# Patient Record
Sex: FEMALE | Race: WHITE | NOT HISPANIC OR LATINO | Employment: OTHER | URBAN - METROPOLITAN AREA
[De-identification: names, ages, dates, MRNs, and addresses within clinical notes are randomized per-mention and may not be internally consistent; named-entity substitution may affect disease eponyms.]

---

## 2018-04-09 NOTE — PATIENT DISCUSSION
Repeat visual field test to confirm defect but today's HVF does NOT confirm progression OD so IOP is acceptable as is.  CPM.

## 2019-05-15 NOTE — PATIENT DISCUSSION
Recommend Mini lower lift, +/- platysmaplasty, pre/post-tragel, SMAS to chin, ML,NLF(discussed risks and benefits of sx. .. ).

## 2020-03-02 NOTE — PATIENT DISCUSSION
3/2/2020: RNFL and IOP stable.  pt is normally strict on gtts but has been out the last week.  reiterated importance of consistency to prevent VA loss/blindness.

## 2020-08-03 NOTE — PATIENT DISCUSSION
8/3/2020:  pt feels peripheral VA is worsening ed we will get HVF in very near future to see if this is able to be substantiated.  also check color VA at FU.

## 2020-08-17 NOTE — PATIENT DISCUSSION
8/17/2020: ed no change in peripheral VA noted on HVF today.  color vision is normal.  CPM and follow as IOP is acceptable for ONHs and VFs at this time.

## 2021-05-11 NOTE — PATIENT DISCUSSION
Recommend Mini lower lift, +platysmaplasty, pretragel, SMAS to chin (discussed risks and benefits of sx. .. ).

## 2021-05-11 NOTE — PATIENT DISCUSSION
Recommend Bilateral upper lid blepharoplasty. cosmetic (discussed risks and benefits of sx. ..). Right eye smaller, lid skin heavier.

## 2021-05-17 NOTE — PATIENT DISCUSSION
Patient informed of available non-opioid medications and other non-pharmacological options. Discussed advantages and disadvantages of the alternatives, including whether the patient is at-risk for, or has a history of, controlled substance abuse or misuse, and the patient’s personal preferences. [facility] “Opioid Alternative Pamphlet” was provided to patient.

## 2021-06-08 NOTE — PATIENT DISCUSSION
8/3/2020:  pt feels peripheral VA is worsening ed we will get HVF in very near future to see if this is able to be substantiated.  also check color VA at FU. This writer attempted to contact the patient on 12/22/20    Reason for call: results and appointment needed from the provider and left message.    If patient calls back:   Relay message from provider below, (read verbatim) and assist the patient with making an appointment with internal medicine.  Document that pt called and close encounter.    Tiara Mann RN     PLEASE CALL PATIENT:   Dear Zulay,    Your test results are listed below.       Kidney function improved. Liver tests still poor. ast over 400.  Continue with gastroenterology for this.     She was to establish with internal medicine, please have her make a f/u appointment with them and continue with them for ongoing care as she has become too complicated for me to follow.       Sincerely,   Dania Munoz PA-C

## 2021-06-08 NOTE — PATIENT DISCUSSION
Patient informed of available non-opioid medications and other non-pharmacological options. Discussed advantages and disadvantages of the alternatives, including whether the patient is at-risk for, or has a history of, controlled substance abuse or misuse, and the patient’s personal preferences. [facility] “Opioid Alternative Pamphlet” was provided to patient.

## 2021-06-08 NOTE — PATIENT DISCUSSION
Patient informed of available non-opioid medications and other non-pharmacological options. Discussed advantages and disadvantages of the alternatives, including whether the patient is at-risk for, or has a history of, controlled substance abuse or misuse, and the patient’s personal preferences. [facility] “Opioid Alternative Pamphlet” was provided to patient.

## 2021-06-15 NOTE — PATIENT DISCUSSION
One week post op: lower lids in good position, no infection, healing well, use erythromycin BID to lower eyelids and Tobradex BID. Wear sunglasses and hat while outdoors. Avoid sun exposure. Use e-mycin to massage BLL upward 2-3x/day.

## 2021-06-15 NOTE — PATIENT DISCUSSION
One week post op: great curve and shape to upper lids, no infection, healing well, sutures intact and removed, use erythromycin BID to upper eyelids. Wear sunglasses and hat while outdoors. Avoid sun exposure.

## 2021-06-29 NOTE — PATIENT DISCUSSION
Slight Ectropian LLL, lateral pulling more then RLL. Getting better. Continue massaging in an upward motion.

## 2021-06-29 NOTE — PATIENT DISCUSSION
Recommend 64 units of Botox. Patient elects Botox injection. 36units discarded, 64 units used. (discussed risks and benefits of BOTOX. ..). Lot# [id number], exp. 10/2023.

## 2021-07-07 ENCOUNTER — NEW PATIENT (OUTPATIENT)
Dept: URBAN - METROPOLITAN AREA CLINIC 26 | Facility: CLINIC | Age: 76
End: 2021-07-07

## 2021-07-07 VITALS
HEART RATE: 81 BPM | SYSTOLIC BLOOD PRESSURE: 98 MMHG | BODY MASS INDEX: 42.82 KG/M2 | DIASTOLIC BLOOD PRESSURE: 72 MMHG | WEIGHT: 204 LBS | HEIGHT: 58 IN

## 2021-07-07 DIAGNOSIS — H35.373: ICD-10-CM

## 2021-07-07 DIAGNOSIS — H35.363: ICD-10-CM

## 2021-07-07 DIAGNOSIS — H33.322: ICD-10-CM

## 2021-07-07 DIAGNOSIS — H43.391: ICD-10-CM

## 2021-07-07 DIAGNOSIS — H35.343: ICD-10-CM

## 2021-07-07 DIAGNOSIS — H43.813: ICD-10-CM

## 2021-07-07 DIAGNOSIS — H35.461: ICD-10-CM

## 2021-07-07 PROCEDURE — 92250 FUNDUS PHOTOGRAPHY W/I&R: CPT

## 2021-07-07 PROCEDURE — 92134 CPTRZ OPH DX IMG PST SGM RTA: CPT

## 2021-07-07 PROCEDURE — 99204 OFFICE O/P NEW MOD 45 MIN: CPT

## 2021-07-07 ASSESSMENT — TONOMETRY
OD_IOP_MMHG: 16
OS_IOP_MMHG: 14

## 2021-07-07 ASSESSMENT — VISUAL ACUITY
OS_SC: 20/20-1
OD_SC: 20/20-2

## 2021-07-20 NOTE — PATIENT DISCUSSION
Follow with Dr. Suzan Khoury in 4-5 months if LLL bump is still persistent. Pt may need a steroid injection if bump does not resolve on its own.

## 2021-07-20 NOTE — PATIENT DISCUSSION
Recommend Mini lower lift, + platysmaplasty, post-tragel, SMAS to chin (discussed risks and benefits of sx. .. ).

## 2021-08-30 PROBLEM — Z98.890: Noted: 2021-08-30

## 2021-09-23 NOTE — PATIENT DISCUSSION
9/23/21:.  S/P BULB pt feels peripheral VA is MUCH BETTER.  will hold off on HVF at this time as was likely just due to [facility] and ONHs do not support actual VF defect concerns.

## 2021-09-23 NOTE — PATIENT DISCUSSION
Follow with Dr. Roselyn Mary in 4-5 months if LLL bump is still persistent. Pt may need a steroid injection if bump does not resolve on its own.

## 2022-02-09 ENCOUNTER — FOLLOW UP (OUTPATIENT)
Dept: URBAN - METROPOLITAN AREA CLINIC 26 | Facility: CLINIC | Age: 77
End: 2022-02-09

## 2022-02-09 VITALS — BODY MASS INDEX: 43.03 KG/M2 | WEIGHT: 205 LBS | HEIGHT: 58 IN

## 2022-02-09 DIAGNOSIS — H33.322: ICD-10-CM

## 2022-02-09 DIAGNOSIS — H43.812: ICD-10-CM

## 2022-02-09 DIAGNOSIS — H35.372: ICD-10-CM

## 2022-02-09 DIAGNOSIS — H35.371: ICD-10-CM

## 2022-02-09 DIAGNOSIS — H35.363: ICD-10-CM

## 2022-02-09 DIAGNOSIS — H04.123: ICD-10-CM

## 2022-02-09 DIAGNOSIS — H35.342: ICD-10-CM

## 2022-02-09 DIAGNOSIS — H35.461: ICD-10-CM

## 2022-02-09 PROCEDURE — 92014 COMPRE OPH EXAM EST PT 1/>: CPT

## 2022-02-09 PROCEDURE — 92134 CPTRZ OPH DX IMG PST SGM RTA: CPT

## 2022-02-09 ASSESSMENT — VISUAL ACUITY
OD_SC: 20/25-2
OS_SC: 20/25-1

## 2022-02-09 ASSESSMENT — TONOMETRY
OS_IOP_MMHG: 13
OD_IOP_MMHG: 14

## 2022-03-30 NOTE — PATIENT DISCUSSION
Recommend 64 units of Botox. Patient elects Botox injection. 36units discarded, 64 units used. (discussed risks and benefits of BOTOX. ..). [id number] Exp: 0/2024.

## 2022-03-30 NOTE — PATIENT DISCUSSION
Recommend Mini lower lift, + platysmaplasty, post-tragel, SMAS to Chin and ML  (discussed risks and benefits of sx. .. ).

## 2022-03-30 NOTE — PATIENT DISCUSSION
9/23/21:.  S/P BULB pt feels peripheral VA is MUCH BETTER.  will hold off on HVF at this time as was likely just due to [facility] and ONHs do not support actual VF defect concerns.

## 2022-05-06 NOTE — PATIENT DISCUSSION
5/6/2022: IOP and RNFL is stable.  next visit will double check HVF should be better now that BULB has been performed.

## 2022-05-06 NOTE — PATIENT DISCUSSION
9/23/21:.  S/P BULB pt feels peripheral VA is MUCH BETTER.  will hold off on HVF at this time as was likely just due to [facility] and ONHs do not support actual VF defect concerns.

## 2022-07-09 ENCOUNTER — TELEPHONE ENCOUNTER (OUTPATIENT)
Dept: URBAN - METROPOLITAN AREA CLINIC 121 | Facility: CLINIC | Age: 77
End: 2022-07-09

## 2022-07-10 ENCOUNTER — TELEPHONE ENCOUNTER (OUTPATIENT)
Dept: URBAN - METROPOLITAN AREA CLINIC 121 | Facility: CLINIC | Age: 77
End: 2022-07-10

## 2022-07-10 RX ORDER — LISINOPRIL 5 MG/1
TABLET ORAL ONCE A DAY
Refills: 0 | Status: ACTIVE | COMMUNITY
Start: 2019-01-16

## 2022-07-10 RX ORDER — SIMVASTATIN 10 MG/1
TABLET, FILM COATED ORAL ONCE A DAY
Refills: 0 | Status: ACTIVE | COMMUNITY
Start: 2019-01-16

## 2022-10-10 NOTE — PATIENT DISCUSSION
Labs are all good except mild elevated potassium and elevated fasting blood sugar and triglycerides.  Please continue to work on limiting concentrated carbs and sweets.  Exercise regularly.  Avoid potassium rich foods like banana, peanut butter, milk products together.  We will discuss more in your upcoming appointment.  Dr. Cotto Recommended artificial tears to use: 1 drop 4x a day in both eyes.

## 2022-12-15 ENCOUNTER — FOLLOW UP (OUTPATIENT)
Dept: URBAN - METROPOLITAN AREA CLINIC 26 | Facility: CLINIC | Age: 77
End: 2022-12-15

## 2022-12-15 PROCEDURE — 92014 COMPRE OPH EXAM EST PT 1/>: CPT

## 2022-12-15 PROCEDURE — 92250 FUNDUS PHOTOGRAPHY W/I&R: CPT

## 2022-12-15 PROCEDURE — 92134 CPTRZ OPH DX IMG PST SGM RTA: CPT

## 2022-12-15 ASSESSMENT — VISUAL ACUITY
OD_SC: 20/25-2
OS_SC: 20/25-1

## 2022-12-15 ASSESSMENT — TONOMETRY
OD_IOP_MMHG: 16
OS_IOP_MMHG: 15

## 2023-01-06 NOTE — PATIENT DISCUSSION
Continue current management. Subjective   Jean Noriega is a 75 y.o. male.     Chief Complaint   Patient presents with   • Cough       History of Present Illness  He presents with c/o cough that started on Wednesday. He states he was coughing every 30 seconds last night and yesterday but it is better today. He is coughing up yellow sputum. He denies fever. He had a sore throat but it is better. He has taken otc cough medicine.        The following portions of the patient's history were reviewed and updated as appropriate: allergies, current medications, past family history, past medical history, past social history, past surgical history and problem list.    Review of Systems   Constitutional: Negative for fever and unexpected weight change.   HENT: Positive for sore throat. Negative for ear pain, rhinorrhea and trouble swallowing.    Respiratory: Positive for cough. Negative for shortness of breath and wheezing.    Cardiovascular: Negative for chest pain and palpitations.   Gastrointestinal: Negative for abdominal pain, blood in stool, constipation, diarrhea, nausea and vomiting.   Genitourinary: Negative for dysuria and hematuria.   Musculoskeletal: Negative for arthralgias and myalgias.   Allergic/Immunologic: Negative for environmental allergies.   Neurological: Positive for headaches (ocasionally). Negative for dizziness.   Hematological: Negative for adenopathy.   Psychiatric/Behavioral: Negative for sleep disturbance and suicidal ideas. The patient is not nervous/anxious.        Objective     /60 (BP Location: Right arm, Patient Position: Sitting, Cuff Size: Adult)   Pulse 76   Temp 97.3 °F (36.3 °C) (Temporal)   Resp 18   Ht 175.3 cm (69.02\")   Wt 66.2 kg (146 lb)   SpO2 98%   BMI 21.55 kg/m²     Physical Exam  Vitals reviewed.   Constitutional:       General: He is not in acute distress.     Appearance: He is well-developed. He is not diaphoretic.   HENT:      Head: Normocephalic and atraumatic.      Right Ear: Hearing,  tympanic membrane, ear canal and external ear normal.      Left Ear: Hearing, tympanic membrane, ear canal and external ear normal.      Nose: Nose normal.      Right Sinus: No maxillary sinus tenderness or frontal sinus tenderness.      Left Sinus: No maxillary sinus tenderness or frontal sinus tenderness.      Mouth/Throat:      Pharynx: Uvula midline.   Eyes:      General: Lids are normal.      Conjunctiva/sclera: Conjunctivae normal.      Pupils: Pupils are equal, round, and reactive to light.   Neck:      Trachea: Trachea normal. No tracheal tenderness or tracheal deviation.   Cardiovascular:      Rate and Rhythm: Normal rate and regular rhythm.      Heart sounds: Normal heart sounds, S1 normal and S2 normal. No murmur heard.    No friction rub. No gallop.   Pulmonary:      Effort: Pulmonary effort is normal. No respiratory distress.      Breath sounds: Normal breath sounds.   Abdominal:      General: Bowel sounds are normal. There is no distension.      Palpations: Abdomen is soft.      Tenderness: There is no abdominal tenderness.   Skin:     General: Skin is warm and dry.   Neurological:      Mental Status: He is alert and oriented to person, place, and time.   Psychiatric:         Behavior: Behavior normal.         Thought Content: Thought content normal.         Judgment: Judgment normal.         Current Outpatient Medications   Medication Sig Dispense Refill   • ASPIRIN 81 PO aspirin 81 mg tablet     • atorvastatin (LIPITOR) 40 MG tablet Take 40 mg by mouth Daily.     • Blood Glucose Monitoring Suppl (Blood Glucose Monitor System) w/Device kit 1 Device Daily. 1 each 0   • Creon [id number] units capsule delayed-release particles capsule TAKE 3 CAPSULES BY MOUTH BEFORE MEAL(S) AND 1 CAPSULE BEFORE SNACKS     • fluticasone (Flonase) 50 MCG/ACT nasal spray 2 sprays into the nostril(s) as directed by provider Daily. 16 g 5   • glimepiride (AMARYL) 2 MG tablet Take 1 tablet by mouth Every Morning Before  Breakfast. 90 tablet 3   • glucose blood (OneTouch Verio) test strip 1 each by Other route Daily. Use as instructed 50 each 0   • Lancets (OneTouch Delica Plus Lbkroa15I) misc 1 each by Other route Daily. 100 each 0   • levocetirizine (XYZAL) 5 MG tablet Take 1 tablet by mouth Every Evening. 30 tablet 0   • losartan (COZAAR) 100 MG tablet      • metFORMIN (GLUCOPHAGE) 1000 MG tablet Take 1 tablet by mouth 2 (Two) Times a Day With Meals. 180 tablet 1   • metFORMIN (GLUCOPHAGE) 1000 MG tablet Take 1,000 mg by mouth.     • MULTIPLE VITAMIN PO Take 1 tablet by mouth Daily.     • pantoprazole (PROTONIX) 40 MG EC tablet Take 40 mg by mouth 2 (Two) Times a Day.     • promethazine-dextromethorphan (PROMETHAZINE-DM) 6.25-15 MG/5ML syrup Take 5 mL by mouth 4 (Four) Times a Day As Needed for Cough. 240 mL 0   • ursodiol (ACTIGALL) 500 MG tablet Take 500 mg by mouth 3 (Three) Times a Day.     • cefdinir (OMNICEF) 300 MG capsule Take 1 capsule by mouth 2 (Two) Times a Day. 20 capsule 0     Current Facility-Administered Medications   Medication Dose Route Frequency Provider Last Rate Last Admin   • cyanocobalamin injection 1,000 mcg  1,000 mcg Intramuscular Weekly Aileen He APRN   1,000 mcg at 05/18/22 0939            Assessment & Plan     Problem List Items Addressed This Visit    None  Visit Diagnoses     Acute URI        Relevant Medications    cefdinir (OMNICEF) 300 MG capsule    promethazine-dextromethorphan (PROMETHAZINE-DM) 6.25-15 MG/5ML syrup    Other Relevant Orders    POCT SARS-CoV-2 Antigen JESSI (Completed)            ICD-10-CM ICD-9-CM   1. Acute URI  J06.9 465.9       Plan: Flu and covid negative. Cefdinir ordered for acute uri. Promethazine dm ordered for cough as needed. This medication may make you sleepy, do not take and drive, operate atv, or heavy machinery. Keep scheduled follow up and follow up as needed.       @Body mass index is 21.55 kg/m².                Understands disease processes and need  for medications.  Understands reasons for urgent and emergent care.  Patient (& family) verbalized agreement for treatment plan.   Emotional support and active listening provided.  Patient provided time to verbalize feelings.           BMI is within normal parameters. No other follow-up for BMI required.      This document has been electronically signed by JADE Murphy   January 6, 2023 08:47 EST

## 2023-07-28 DIAGNOSIS — M06.00 RHEUMATOID ARTHRITIS WITH NEGATIVE RHEUMATOID FACTOR, INVOLVING UNSPECIFIED SITE (CMD): Primary | ICD-10-CM

## 2023-08-10 ENCOUNTER — HOSPITAL ENCOUNTER (OUTPATIENT)
Dept: ULTRASOUND IMAGING | Age: 78
Discharge: HOME | End: 2023-08-10
Attending: INTERNAL MEDICINE

## 2023-08-10 DIAGNOSIS — M06.00 RHEUMATOID ARTHRITIS WITH NEGATIVE RHEUMATOID FACTOR, INVOLVING UNSPECIFIED SITE (CMD): Primary | ICD-10-CM

## 2023-08-10 DIAGNOSIS — M06.00 RHEUMATOID ARTHRITIS WITH NEGATIVE RHEUMATOID FACTOR, INVOLVING UNSPECIFIED SITE (CMD): ICD-10-CM

## 2023-08-10 PROCEDURE — 76882 US LMTD JT/FCL EVL NVASC XTR: CPT

## 2024-09-12 ENCOUNTER — TELEPHONE ENCOUNTER (OUTPATIENT)
Dept: URBAN - METROPOLITAN AREA CLINIC 64 | Facility: CLINIC | Age: 79
End: 2024-09-12

## 2024-10-18 ENCOUNTER — HOSPITAL ENCOUNTER (OUTPATIENT)
Dept: ULTRASOUND IMAGING | Age: 79
End: 2024-10-18
Attending: INTERNAL MEDICINE

## 2024-10-18 DIAGNOSIS — M06.00 RHEUMATOID ARTHRITIS WITH NEGATIVE RHEUMATOID FACTOR, INVOLVING UNSPECIFIED SITE  (CMD): ICD-10-CM

## 2024-10-18 PROCEDURE — 76882 US LMTD JT/FCL EVL NVASC XTR: CPT

## 2025-06-23 NOTE — PATIENT DISCUSSION
[facility]  [address] [address]  Dept: [phone]  Dept Fax: [phone]      Ivan Tom :1988 MRN:[id number]      2025 Time Session Began: 1:00pm  Time Session Ended: 1:50pm    In Person: This visit was performed in person. Consent to Treatment form was reviewed, discussed, and signed with patient.    Session Type:Therapy 38-52 minutes (18530)    Others Present: No    Intervention: Insight, Solution focused, Supportive    Suicide/Homicide/Violence Ideation: Yes    If Yes, explain: Patient continues to experience passive suicidal ideation; no plan/intent. Reports \"several days\" for frequency in past two weeks.    Current Outpatient Medications   Medication Sig    sulfamethoxazole-trimethoprim (Bactrim DS) 800-160 MG per tablet Take 1 tablet by mouth in the morning and 1 tablet in the evening.    buPROPion (WELLBUTRIN SR) 150 MG 12 hr tablet Start 1 tablet orally once daily for 1 week, then take 1 tablet twice a day.    hydrOXYzine (ATARAX) 25 MG tablet Take 1-2 tablets orally once daily at bedtime as needed for sleep and anxiety.    diclofenac (VOLTAREN) 75 MG EC tablet Take 1 tablet by mouth 2 times daily as needed (pain). (Patient not taking: Reported on 2024)     No current facility-administered medications for this visit.       Change in Medication(s) Reported: N/A  If Yes, explain:     Patient/Family Education Provided: Yes  Patient/Family Displays Understanding: Yes    If No, explain:     Chief complaint in patient's own words: Anxiety & depression    D: Patient presented for a follow-up appointment on this date, 25.  During today's session, Ivan Tom appeared restless, anxious, and engaged well. Patient shared about his continues struggle with sleep hygiene and feeling tired daily. He shared data from a sleep tracker with writer and they set goals for patient to schedule an appointment with his PCP and request a sleep study. He also  IOP is acceptable as is.  CPM. RNFL stable with past.  ONH is stable. explored ways to reduce time on his phone before bed; they discussed dopamine and the relation to ADHD and the use of electronics/social media. Patient endorsed ability to reduce his caffeine intake since last session. Patient processed his continued struggle with routine building and following through with goals. He engaged in negative self-talk and was receptive to writer's feedback. Patient was willing to set goal to engage in one routine and one enjoyable behavioral activation daily and to track this with intention daily until next session as a way to promote habits and self-compassion. Patient also agreed to work on his list of 20 things he likes about himself before next session. He set goal to call PCP after this session. Patient was future oriented throughout this interaction.     Assessment scores:    PHQ-9: 13  HENRY-7: 11    A: Therapist provided a supportive therapeutic environment for the patient to share their thoughts, feelings, and experiences. Cognitions shared by Ivan Tom were acknowledged and exploration was encouraged. Therapist provided verbal feedback and support throughout this session to the patient.     R: Ivan Tom was alert and oriented x4. Patient presented restless and cooperative. Mood appeared anxious and low with congruent affect. Eye contact was appropriate. Speech was normal in rate, tone, and volume. Motor activity was unremarkable. Thought process was future oriented and goal directed. Patient experiences passive suicidal ideation without plan/intent; no homicidal ideation or self-harm ideation.     P: In the next sessions, Therapist and Ivan Tom will continue to work on implementing strategies to manage their concerns and emotions effectively. Therapist will monitor Ivan Tom progress with mood improvement, ADHD symptom management, and habit execution, while providing ongoing support and guidance as needed. Regular check-ins will be scheduled to monitor PT's  progress and adjust therapeutic interventions as necessary to support their ongoing growth and development. Writer will also review the treatment plan of Ivan Tom  regularly to ensure it remains relevant and effective in addressing their identified needs and goals.     Need for Community Resources Assessed: Yes    Resources Needed: No    If Yes, what resources:     DIAGNOSES: ADHD; Generalized Anxiety Disorder; Major Depressive Disorder , recurrent, moderate      Treatment Plan: Unchanged    Discharge Plan: N/A    Next Appointment: 7/7/25  Bella Pickett LCSW